# Patient Record
Sex: FEMALE | Race: WHITE | HISPANIC OR LATINO
[De-identification: names, ages, dates, MRNs, and addresses within clinical notes are randomized per-mention and may not be internally consistent; named-entity substitution may affect disease eponyms.]

---
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
## 2024-04-18 ENCOUNTER — APPOINTMENT (OUTPATIENT)
Dept: PEDIATRICS | Facility: CLINIC | Age: 19
End: 2024-04-18

## 2024-04-18 VITALS — TEMPERATURE: 97.8 F

## 2024-04-18 DIAGNOSIS — Z11.1 ENCOUNTER FOR SCREENING FOR RESPIRATORY TUBERCULOSIS: ICD-10-CM

## 2024-04-18 NOTE — PHYSICAL EXAM
[Acute Distress] : no acute distress [Alert] : alert [Tired appearing] : not tired appearing [Lethargic] : not lethargic [Toxic] : not toxic [Traumatic] : atraumatic [EOMI] : grossly EOMI [Nasal Flaring] : no nasal flaring [Tachypnea] : no tachypnea [Moves All Extremities x 4] : moves all extremities x4 [Clear] : clear

## 2024-04-18 NOTE — HISTORY OF PRESENT ILLNESS
[FreeTextEntry6] : No forms to be filled by MD Has a copy of vaccine records Just a TB test  Email to: [email or web address] Confirmed email with patient

## 2024-04-20 ENCOUNTER — NON-APPOINTMENT (OUTPATIENT)
Age: 19
End: 2024-04-20

## 2024-04-22 LAB
M TB IFN-G BLD-IMP: NEGATIVE
QUANTIFERON TB PLUS MITOGEN MINUS NIL: >10 IU/ML
QUANTIFERON TB PLUS NIL: 0.03 IU/ML
QUANTIFERON TB PLUS TB1 MINUS NIL: 0 IU/ML
QUANTIFERON TB PLUS TB2 MINUS NIL: 0 IU/ML

## 2024-10-03 ENCOUNTER — APPOINTMENT (OUTPATIENT)
Dept: PEDIATRICS | Facility: CLINIC | Age: 19
End: 2024-10-03

## 2024-10-03 VITALS
TEMPERATURE: 96 F | BODY MASS INDEX: 23.17 KG/M2 | HEART RATE: 98 BPM | HEIGHT: 63.78 IN | WEIGHT: 134.04 LBS | SYSTOLIC BLOOD PRESSURE: 118 MMHG | DIASTOLIC BLOOD PRESSURE: 70 MMHG

## 2024-10-03 DIAGNOSIS — Z23 ENCOUNTER FOR IMMUNIZATION: ICD-10-CM

## 2024-10-03 DIAGNOSIS — Z00.00 ENCOUNTER FOR GENERAL ADULT MEDICAL EXAMINATION W/OUT ABNORMAL FINDINGS: ICD-10-CM

## 2024-10-03 RX ORDER — ESCITALOPRAM OXALATE 5 MG/1
5 TABLET ORAL
Refills: 0 | Status: ACTIVE | COMMUNITY
Start: 2024-10-03

## 2024-10-03 RX ORDER — DROSPIRENONE 4 MG/1
4 TABLET, FILM COATED ORAL
Refills: 0 | Status: ACTIVE | COMMUNITY
Start: 2024-10-03

## 2024-10-03 RX ORDER — SPIRONOLACTONE 50 MG/1
50 TABLET ORAL DAILY
Refills: 0 | Status: ACTIVE | COMMUNITY
Start: 2024-10-03

## 2024-10-07 PROBLEM — Z23 ENCOUNTER FOR IMMUNIZATION: Status: ACTIVE | Noted: 2024-10-03 | Resolved: 2024-10-17

## 2024-10-07 NOTE — RISK ASSESSMENT
[0] : 2) Feeling down, depressed, or hopeless: Not at all (0) [PHQ-2 Negative - No further assessment needed] : PHQ-2 Negative - No further assessment needed [[id number]] : 0

## 2024-10-07 NOTE — RISK ASSESSMENT
[0] : 2) Feeling down, depressed, or hopeless: Not at all (0) [PHQ-2 Negative - No further assessment needed] : PHQ-2 Negative - No further assessment needed [[id number]] : 0

## 2024-10-07 NOTE — HISTORY OF PRESENT ILLNESS
[Up to date] : Up to date [Normal] : normal [LMP: _____] : LMP: [unfilled] [Eats meals with family] : eats meals with family [Has family members/adults to turn to for help] : has family members/adults to turn to for help [Is permitted and is able to make independent decisions] : Is permitted and is able to make independent decisions [Sleep Concerns] : sleep concerns [Eats regular meals including adequate fruits and vegetables] : eats regular meals including adequate fruits and vegetables [Drinks non-sweetened liquids] : drinks non-sweetened liquids  [Has friends] : has friends [At least 1 hour of physical activity a day] : at least 1 hour of physical activity a day [Screen time (except homework) less than 2 hours a day] : screen time (except homework) less than 2 hours a day [Has interests/participates in community activities/volunteers] : has interests/participates in community activities/volunteers. [No] : No cigarette smoke exposure [Calcium source] : no calcium source [Has concerns about body or appearance] : does not have concerns about body or appearance [FreeTextEntry7] : She commutes to school on a estefany. She is studying Bio and minor biochemistry [de-identified] : commutes 90 min each way [de-identified] : needs tetanus booster and flu vaccine [FreeTextEntry8] : Dr. Tianna Nascimento at [facility]. She is on OCP, Slynd for her acne. and 50 mg Spironolactone.  not sexually active,  [de-identified] : 6-8 hours per night [de-identified] : [employer] in [employer] [de-identified] : no dairy, exercises about 2 x week, karate and kickboxing. walks a lot, weight training over the summer. [de-identified] : no smoking, no drugs, very rare social drinking. [FreeTextEntry1] : She is going to be volunteering in a free clinic on Saturdays at [facility].

## 2024-10-07 NOTE — HISTORY OF PRESENT ILLNESS
[Up to date] : Up to date [Normal] : normal [LMP: _____] : LMP: [unfilled] [Eats meals with family] : eats meals with family [Has family members/adults to turn to for help] : has family members/adults to turn to for help [Is permitted and is able to make independent decisions] : Is permitted and is able to make independent decisions [Sleep Concerns] : sleep concerns [Eats regular meals including adequate fruits and vegetables] : eats regular meals including adequate fruits and vegetables [Drinks non-sweetened liquids] : drinks non-sweetened liquids  [Has friends] : has friends [At least 1 hour of physical activity a day] : at least 1 hour of physical activity a day [Screen time (except homework) less than 2 hours a day] : screen time (except homework) less than 2 hours a day [Has interests/participates in community activities/volunteers] : has interests/participates in community activities/volunteers. [No] : No cigarette smoke exposure [Calcium source] : no calcium source [Has concerns about body or appearance] : does not have concerns about body or appearance [FreeTextEntry7] : She commutes to school on a estefany. She is studying Bio and minor biochemistry [de-identified] : commutes 90 min each way [de-identified] : needs tetanus booster and flu vaccine [FreeTextEntry8] : Dr. Tianna Nascimento at [facility]. She is on OCP, Slynd for her acne. and 50 mg Spironolactone.  not sexually active,  [de-identified] : 6-8 hours per night [de-identified] : [employer] in [employer] [de-identified] : no dairy, exercises about 2 x week, karate and kickboxing. walks a lot, weight training over the summer. [de-identified] : no smoking, no drugs, very rare social drinking. [FreeTextEntry1] : She is going to be volunteering in a free clinic on Saturdays at [facility].